# Patient Record
Sex: MALE | Race: OTHER | HISPANIC OR LATINO | URBAN - METROPOLITAN AREA
[De-identification: names, ages, dates, MRNs, and addresses within clinical notes are randomized per-mention and may not be internally consistent; named-entity substitution may affect disease eponyms.]

---

## 2021-06-27 ENCOUNTER — INPATIENT (INPATIENT)
Facility: HOSPITAL | Age: 21
LOS: 2 days | Discharge: HOME | End: 2021-06-30
Attending: INTERNAL MEDICINE | Admitting: INTERNAL MEDICINE
Payer: MEDICAID

## 2021-06-27 VITALS
OXYGEN SATURATION: 98 % | SYSTOLIC BLOOD PRESSURE: 141 MMHG | HEART RATE: 93 BPM | DIASTOLIC BLOOD PRESSURE: 86 MMHG | TEMPERATURE: 98 F | RESPIRATION RATE: 16 BRPM

## 2021-06-27 PROCEDURE — 99285 EMERGENCY DEPT VISIT HI MDM: CPT | Mod: 25

## 2021-06-27 PROCEDURE — 10060 I&D ABSCESS SIMPLE/SINGLE: CPT
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
## 2021-06-28 DIAGNOSIS — L03.90 CELLULITIS, UNSPECIFIED: ICD-10-CM

## 2021-06-28 DIAGNOSIS — L02.419 CUTANEOUS ABSCESS OF LIMB, UNSPECIFIED: ICD-10-CM

## 2021-06-28 DIAGNOSIS — L03.119 CELLULITIS OF UNSPECIFIED PART OF LIMB: ICD-10-CM

## 2021-06-28 DIAGNOSIS — D64.9 ANEMIA, UNSPECIFIED: ICD-10-CM

## 2021-06-28 DIAGNOSIS — Z29.9 ENCOUNTER FOR PROPHYLACTIC MEASURES, UNSPECIFIED: ICD-10-CM

## 2021-06-28 LAB
ALBUMIN SERPL ELPH-MCNC: 4.1 G/DL — SIGNIFICANT CHANGE UP (ref 3.3–5)
ALP SERPL-CCNC: 67 U/L — SIGNIFICANT CHANGE UP (ref 40–120)
ALT FLD-CCNC: 17 U/L — SIGNIFICANT CHANGE UP (ref 4–41)
ANION GAP SERPL CALC-SCNC: 12 MMOL/L — SIGNIFICANT CHANGE UP (ref 7–14)
ANION GAP SERPL CALC-SCNC: 12 MMOL/L — SIGNIFICANT CHANGE UP (ref 7–14)
AST SERPL-CCNC: 15 U/L — SIGNIFICANT CHANGE UP (ref 4–40)
BASE EXCESS BLDV CALC-SCNC: 5.2 MMOL/L — HIGH (ref -3–2)
BASOPHILS # BLD AUTO: 0.03 K/UL — SIGNIFICANT CHANGE UP (ref 0–0.2)
BASOPHILS # BLD AUTO: 0.04 K/UL — SIGNIFICANT CHANGE UP (ref 0–0.2)
BASOPHILS NFR BLD AUTO: 0.3 % — SIGNIFICANT CHANGE UP (ref 0–2)
BASOPHILS NFR BLD AUTO: 0.5 % — SIGNIFICANT CHANGE UP (ref 0–2)
BILIRUB SERPL-MCNC: 0.4 MG/DL — SIGNIFICANT CHANGE UP (ref 0.2–1.2)
BLOOD GAS VENOUS - CREATININE: 1 MG/DL — SIGNIFICANT CHANGE UP (ref 0.5–1.3)
BLOOD GAS VENOUS COMPREHENSIVE RESULT: SIGNIFICANT CHANGE UP
BUN SERPL-MCNC: 12 MG/DL — SIGNIFICANT CHANGE UP (ref 7–23)
BUN SERPL-MCNC: 16 MG/DL — SIGNIFICANT CHANGE UP (ref 7–23)
CALCIUM SERPL-MCNC: 8.9 MG/DL — SIGNIFICANT CHANGE UP (ref 8.4–10.5)
CALCIUM SERPL-MCNC: 9.3 MG/DL — SIGNIFICANT CHANGE UP (ref 8.4–10.5)
CHLORIDE BLDV-SCNC: 100 MMOL/L — SIGNIFICANT CHANGE UP (ref 96–108)
CHLORIDE SERPL-SCNC: 101 MMOL/L — SIGNIFICANT CHANGE UP (ref 98–107)
CHLORIDE SERPL-SCNC: 99 MMOL/L — SIGNIFICANT CHANGE UP (ref 98–107)
CO2 SERPL-SCNC: 24 MMOL/L — SIGNIFICANT CHANGE UP (ref 22–31)
CO2 SERPL-SCNC: 26 MMOL/L — SIGNIFICANT CHANGE UP (ref 22–31)
CREAT SERPL-MCNC: 0.73 MG/DL — SIGNIFICANT CHANGE UP (ref 0.5–1.3)
CREAT SERPL-MCNC: 0.91 MG/DL — SIGNIFICANT CHANGE UP (ref 0.5–1.3)
EOSINOPHIL # BLD AUTO: 0.31 K/UL — SIGNIFICANT CHANGE UP (ref 0–0.5)
EOSINOPHIL # BLD AUTO: 0.33 K/UL — SIGNIFICANT CHANGE UP (ref 0–0.5)
EOSINOPHIL NFR BLD AUTO: 3.7 % — SIGNIFICANT CHANGE UP (ref 0–6)
EOSINOPHIL NFR BLD AUTO: 4 % — SIGNIFICANT CHANGE UP (ref 0–6)
GAS PNL BLDV: 140 MMOL/L — SIGNIFICANT CHANGE UP (ref 136–146)
GLUCOSE BLDV-MCNC: 88 MG/DL — SIGNIFICANT CHANGE UP (ref 70–99)
GLUCOSE SERPL-MCNC: 91 MG/DL — SIGNIFICANT CHANGE UP (ref 70–99)
GLUCOSE SERPL-MCNC: 95 MG/DL — SIGNIFICANT CHANGE UP (ref 70–99)
HCO3 BLDV-SCNC: 27 MMOL/L — SIGNIFICANT CHANGE UP (ref 20–27)
HCT VFR BLD CALC: 35 % — LOW (ref 39–50)
HCT VFR BLD CALC: 38.3 % — LOW (ref 39–50)
HCT VFR BLDA CALC: 41.3 % — SIGNIFICANT CHANGE UP (ref 39–51)
HGB BLD CALC-MCNC: 13.5 G/DL — SIGNIFICANT CHANGE UP (ref 13–17)
HGB BLD-MCNC: 12 G/DL — LOW (ref 13–17)
HGB BLD-MCNC: 12.8 G/DL — LOW (ref 13–17)
IANC: 3.79 K/UL — SIGNIFICANT CHANGE UP (ref 1.5–8.5)
IANC: 5.04 K/UL — SIGNIFICANT CHANGE UP (ref 1.5–8.5)
IMM GRANULOCYTES NFR BLD AUTO: 0.1 % — SIGNIFICANT CHANGE UP (ref 0–1.5)
IMM GRANULOCYTES NFR BLD AUTO: 0.2 % — SIGNIFICANT CHANGE UP (ref 0–1.5)
LACTATE BLDV-MCNC: 1.1 MMOL/L — SIGNIFICANT CHANGE UP (ref 0.5–2)
LYMPHOCYTES # BLD AUTO: 2.42 K/UL — SIGNIFICANT CHANGE UP (ref 1–3.3)
LYMPHOCYTES # BLD AUTO: 2.68 K/UL — SIGNIFICANT CHANGE UP (ref 1–3.3)
LYMPHOCYTES # BLD AUTO: 27 % — SIGNIFICANT CHANGE UP (ref 13–44)
LYMPHOCYTES # BLD AUTO: 34.9 % — SIGNIFICANT CHANGE UP (ref 13–44)
MAGNESIUM SERPL-MCNC: 2.1 MG/DL — SIGNIFICANT CHANGE UP (ref 1.6–2.6)
MAGNESIUM SERPL-MCNC: 2.2 MG/DL — SIGNIFICANT CHANGE UP (ref 1.6–2.6)
MCHC RBC-ENTMCNC: 30.8 PG — SIGNIFICANT CHANGE UP (ref 27–34)
MCHC RBC-ENTMCNC: 31.4 PG — SIGNIFICANT CHANGE UP (ref 27–34)
MCHC RBC-ENTMCNC: 33.4 GM/DL — SIGNIFICANT CHANGE UP (ref 32–36)
MCHC RBC-ENTMCNC: 34.3 GM/DL — SIGNIFICANT CHANGE UP (ref 32–36)
MCV RBC AUTO: 91.6 FL — SIGNIFICANT CHANGE UP (ref 80–100)
MCV RBC AUTO: 92.1 FL — SIGNIFICANT CHANGE UP (ref 80–100)
MONOCYTES # BLD AUTO: 0.84 K/UL — SIGNIFICANT CHANGE UP (ref 0–0.9)
MONOCYTES # BLD AUTO: 1.12 K/UL — HIGH (ref 0–0.9)
MONOCYTES NFR BLD AUTO: 11 % — SIGNIFICANT CHANGE UP (ref 2–14)
MONOCYTES NFR BLD AUTO: 12.5 % — SIGNIFICANT CHANGE UP (ref 2–14)
NEUTROPHILS # BLD AUTO: 3.79 K/UL — SIGNIFICANT CHANGE UP (ref 1.8–7.4)
NEUTROPHILS # BLD AUTO: 5.04 K/UL — SIGNIFICANT CHANGE UP (ref 1.8–7.4)
NEUTROPHILS NFR BLD AUTO: 49.5 % — SIGNIFICANT CHANGE UP (ref 43–77)
NEUTROPHILS NFR BLD AUTO: 56.3 % — SIGNIFICANT CHANGE UP (ref 43–77)
NRBC # BLD: 0 /100 WBCS — SIGNIFICANT CHANGE UP
NRBC # BLD: 0 /100 WBCS — SIGNIFICANT CHANGE UP
NRBC # FLD: 0 K/UL — SIGNIFICANT CHANGE UP
NRBC # FLD: 0 K/UL — SIGNIFICANT CHANGE UP
PCO2 BLDV: 55 MMHG — HIGH (ref 41–51)
PH BLDV: 7.36 — SIGNIFICANT CHANGE UP (ref 7.32–7.43)
PHOSPHATE SERPL-MCNC: 3.4 MG/DL — SIGNIFICANT CHANGE UP (ref 2.5–4.5)
PHOSPHATE SERPL-MCNC: 3.7 MG/DL — SIGNIFICANT CHANGE UP (ref 2.5–4.5)
PLATELET # BLD AUTO: 211 K/UL — SIGNIFICANT CHANGE UP (ref 150–400)
PLATELET # BLD AUTO: 235 K/UL — SIGNIFICANT CHANGE UP (ref 150–400)
PO2 BLDV: 24 MMHG — LOW (ref 35–40)
POTASSIUM BLDV-SCNC: 3.5 MMOL/L — SIGNIFICANT CHANGE UP (ref 3.4–4.5)
POTASSIUM SERPL-MCNC: 3.2 MMOL/L — LOW (ref 3.5–5.3)
POTASSIUM SERPL-MCNC: 3.7 MMOL/L — SIGNIFICANT CHANGE UP (ref 3.5–5.3)
POTASSIUM SERPL-SCNC: 3.2 MMOL/L — LOW (ref 3.5–5.3)
POTASSIUM SERPL-SCNC: 3.7 MMOL/L — SIGNIFICANT CHANGE UP (ref 3.5–5.3)
PROT SERPL-MCNC: 7.6 G/DL — SIGNIFICANT CHANGE UP (ref 6–8.3)
RBC # BLD: 3.82 M/UL — LOW (ref 4.2–5.8)
RBC # BLD: 4.16 M/UL — LOW (ref 4.2–5.8)
RBC # FLD: 11.6 % — SIGNIFICANT CHANGE UP (ref 10.3–14.5)
RBC # FLD: 11.9 % — SIGNIFICANT CHANGE UP (ref 10.3–14.5)
SAO2 % BLDV: 37.2 % — LOW (ref 60–85)
SARS-COV-2 RNA SPEC QL NAA+PROBE: SIGNIFICANT CHANGE UP
SODIUM SERPL-SCNC: 137 MMOL/L — SIGNIFICANT CHANGE UP (ref 135–145)
SODIUM SERPL-SCNC: 137 MMOL/L — SIGNIFICANT CHANGE UP (ref 135–145)
WBC # BLD: 7.67 K/UL — SIGNIFICANT CHANGE UP (ref 3.8–10.5)
WBC # BLD: 8.96 K/UL — SIGNIFICANT CHANGE UP (ref 3.8–10.5)
WBC # FLD AUTO: 7.67 K/UL — SIGNIFICANT CHANGE UP (ref 3.8–10.5)
WBC # FLD AUTO: 8.96 K/UL — SIGNIFICANT CHANGE UP (ref 3.8–10.5)

## 2021-06-28 PROCEDURE — 73590 X-RAY EXAM OF LOWER LEG: CPT | Mod: 26,LT

## 2021-06-28 PROCEDURE — 99223 1ST HOSP IP/OBS HIGH 75: CPT

## 2021-06-28 PROCEDURE — 99232 SBSQ HOSP IP/OBS MODERATE 35: CPT | Mod: GC

## 2021-06-28 PROCEDURE — 99255 IP/OBS CONSLTJ NEW/EST HI 80: CPT

## 2021-06-28 RX ORDER — VANCOMYCIN HCL 1 G
1000 VIAL (EA) INTRAVENOUS EVERY 12 HOURS
Refills: 0 | Status: DISCONTINUED | OUTPATIENT
Start: 2021-06-28 | End: 2021-06-30

## 2021-06-28 RX ORDER — SODIUM CHLORIDE 9 MG/ML
1000 INJECTION, SOLUTION INTRAVENOUS ONCE
Refills: 0 | Status: COMPLETED | OUTPATIENT
Start: 2021-06-28 | End: 2021-06-28

## 2021-06-28 RX ORDER — SODIUM CHLORIDE 9 MG/ML
3 INJECTION INTRAMUSCULAR; INTRAVENOUS; SUBCUTANEOUS EVERY 8 HOURS
Refills: 0 | Status: DISCONTINUED | OUTPATIENT
Start: 2021-06-28 | End: 2021-06-30

## 2021-06-28 RX ORDER — CEFEPIME 1 G/1
1000 INJECTION, POWDER, FOR SOLUTION INTRAMUSCULAR; INTRAVENOUS ONCE
Refills: 0 | Status: COMPLETED | OUTPATIENT
Start: 2021-06-28 | End: 2021-06-28

## 2021-06-28 RX ORDER — CEFEPIME 1 G/1
1000 INJECTION, POWDER, FOR SOLUTION INTRAMUSCULAR; INTRAVENOUS EVERY 12 HOURS
Refills: 0 | Status: DISCONTINUED | OUTPATIENT
Start: 2021-06-28 | End: 2021-06-29

## 2021-06-28 RX ORDER — CEFEPIME 1 G/1
INJECTION, POWDER, FOR SOLUTION INTRAMUSCULAR; INTRAVENOUS
Refills: 0 | Status: DISCONTINUED | OUTPATIENT
Start: 2021-06-28 | End: 2021-06-29

## 2021-06-28 RX ADMIN — CEFEPIME 100 MILLIGRAM(S): 1 INJECTION, POWDER, FOR SOLUTION INTRAMUSCULAR; INTRAVENOUS at 01:43

## 2021-06-28 RX ADMIN — CEFEPIME 100 MILLIGRAM(S): 1 INJECTION, POWDER, FOR SOLUTION INTRAMUSCULAR; INTRAVENOUS at 18:33

## 2021-06-28 RX ADMIN — SODIUM CHLORIDE 1000 MILLILITER(S): 9 INJECTION, SOLUTION INTRAVENOUS at 00:24

## 2021-06-28 RX ADMIN — SODIUM CHLORIDE 3 MILLILITER(S): 9 INJECTION INTRAMUSCULAR; INTRAVENOUS; SUBCUTANEOUS at 21:30

## 2021-06-28 RX ADMIN — SODIUM CHLORIDE 3 MILLILITER(S): 9 INJECTION INTRAMUSCULAR; INTRAVENOUS; SUBCUTANEOUS at 08:21

## 2021-06-28 RX ADMIN — SODIUM CHLORIDE 3 MILLILITER(S): 9 INJECTION INTRAMUSCULAR; INTRAVENOUS; SUBCUTANEOUS at 14:30

## 2021-06-28 RX ADMIN — Medication 110 MILLIGRAM(S): at 02:15

## 2021-06-28 RX ADMIN — Medication 250 MILLIGRAM(S): at 14:24
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
## 2021-06-28 NOTE — H&P ADULT - PROBLEM SELECTOR PLAN 3
Will hold off anticoagulation as patient has not risk factors for DVT. Patient with hemoglobin of 12.8 on admission. No S/S of active bleed.  - Monitor H/H
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
## 2021-06-28 NOTE — H&P ADULT - NSHPSOCIALHISTORY_GEN_ALL_CORE
Patient denies use of cigarettes. Patient states he uses marijuana frequently and drinks alcohol occasionally. Patient works in an ice cream shop. Patient denies use of tobacco. Patient states he uses marijuana frequently and drinks alcohol occasionally. Patient works in an ice cream shop.
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
## 2021-06-28 NOTE — H&P ADULT - NSHPPHYSICALEXAM_GEN_ALL_CORE
Vital Signs Last 24 Hrs  T(C): 36.6 (27 Jun 2021 23:27), Max: 36.6 (27 Jun 2021 23:27)  T(F): 97.9 (27 Jun 2021 23:27), Max: 97.9 (27 Jun 2021 23:27)  HR: 93 (27 Jun 2021 23:27) (93 - 93)  BP: 141/86 (27 Jun 2021 23:27) (141/86 - 141/86)  BP(mean): --  RR: 16 (27 Jun 2021 23:27) (16 - 16)  SpO2: 98% (27 Jun 2021 23:27) (98% - 98%)

## 2021-06-28 NOTE — H&P ADULT - PROBLEM SELECTOR PLAN 1
Admit to medicine, continue monitoring.  Surgery recs appreciated.  Daily dressing changes.  Patient s/p Incision and drainage in ED.  Surgery recommend broad spectrum antibiotics.  Patient s/p cefepime and doxycyline in ED, will continue.  Monitor area for increased swelling and erythema.  Monitor for fever. Admit to medicine, continue monitoring.  Surgery recs appreciated.  Daily dressing changes.  Patient s/p Incision and drainage in ED.  Surgery recommend broad spectrum antibiotics.  Patient s/p cefepime and doxycyline in ED, will continue.  Monitor area for increased swelling and erythema.  Monitor for fever.  Follow up cultures. Admit to medicine, continue monitoring.  Surgery recs appreciated.  Daily dressing changes.  Patient s/p Incision and drainage in ED.  Surgery recommend broad spectrum antibiotics.  Patient s/p cefepime and doxycyline in ED, will continue. to cover for vibrio and purulent cellulitis.  Call for ID consult in AM.  Monitor area for increased swelling and erythema.  Monitor for fever.  Follow up cultures. Admit to medicine, continue monitoring.  Surgery recs appreciated.  Daily dressing changes.  Patient s/p Incision and drainage in ED.  Surgery recommend broad spectrum antibiotics.  Patient s/p cefepime and doxycyline in ED, will continue to cover for vibrio (had multiple cases in this area last year) and purulent cellulitis.  Call for ID consult in AM.  Monitor area for increased swelling and erythema.  Monitor for fever.  Follow up cultures. Pt with pus drainage on left anterior lower extremity following recent trauma. S/p I&D in ED.  - Given superimposed cellulitis, c/w abx as below  - Surgery consulted, appreciate recs  - Daily wound care, dressing changes  - Keep LLE elevated

## 2021-06-28 NOTE — H&P ADULT - MUSCULOSKELETAL
no calf tenderness/normal strength detailed exam details… ROM intact/no joint swelling/no joint erythema/no joint warmth/no calf tenderness/normal strength

## 2021-06-28 NOTE — ED PROVIDER NOTE - PHYSICAL EXAMINATION
Gen: NAD, AOx3, able to make needs known, non-toxic  Head: NCAT  HEENT: EOMI, oral mucosa moist, normal conjunctiva  Lung: CTAB, no respiratory distress, no wheezes/rhonchi/rales B/L, speaking in full sentences  CV: RRR, no murmurs  Abd: soft, NTND, no guarding  MSK: no visible deformities. LLE: anterior lower leg w/ approx 20 x 10 cm erythematous area w/ overlying abrasion, pus draining from superior margin w/ gentle pressure applied to wound  Neuro: Appears non focal  Skin: Warm, well perfused  Psych: normal affect Gen: NAD, AOx3, able to make needs known, non-toxic  Head: NCAT  HEENT: EOMI, oral mucosa moist, normal conjunctiva  Lung: CTAB, no respiratory distress, no wheezes/rhonchi/rales B/L, speaking in full sentences  CV: RRR, no murmurs  Abd: soft, NTND, no guarding  MSK: no visible deformities. LLE: anterior lower leg w/ approx 20 x 10 cm erythematous area w/ overlying abrasion, pus draining from superior margin w/ gentle pressure applied to wound but no fluctuance noted  Neuro: Appears non focal  Skin: Warm, well perfused  Psych: normal affect

## 2021-06-28 NOTE — H&P ADULT - ASSESSMENT
20 y/o M with no significant PMH presents to the ED with left leg infection concerning for cellulitis. 20 y/o M with no significant PMH presents to the ED with left leg infection, concerning for abscess with superimposed cellulitis.
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
## 2021-06-28 NOTE — H&P ADULT - NEGATIVE ENMT SYMPTOMS
no vertigo/no nasal congestion no ear pain/no tinnitus/no vertigo/no nasal congestion/no nasal discharge/no throat pain/no dysphagia

## 2021-06-28 NOTE — H&P ADULT - NEGATIVE GASTROINTESTINAL SYMPTOMS
no nausea/no vomiting/no constipation no nausea/no vomiting/no diarrhea/no constipation/no abdominal pain/no melena/no hematochezia

## 2021-06-28 NOTE — H&P ADULT - NEGATIVE NEUROLOGICAL SYMPTOMS
no weakness/no syncope no weakness/no syncope/no vertigo/no loss of sensation/no headache/no loss of consciousness

## 2021-06-28 NOTE — H&P ADULT - EXTREMITIES COMMENTS
20 cm x 8cm area of erythema present on Left anterior lower extremity. Wound bandaged. Area warm to touch. Edema present form ankle to knee. No visible discharge noted. 20 cm x 8 cm area of erythema present on left anterior lower extremity. Wound bandaged. Area warm to touch. Edema present from ankle to knee. No visible discharge/drainage noted. No eschar or crepitus noted.

## 2021-06-28 NOTE — H&P ADULT - PROBLEM SELECTOR PLAN 2
Patient with hemoglobin of 12.8.  Continue to trend. Pt with abscess and superimposed cellulitis following recent trauma, also with exposure to salt water. S/p I&D in ED.  - S/p cefepime and doxycyline in ED, will continue to cover for Vibrio (had multiple cases in this area last year) and MRSA given purulent cellulitis.   - Call for ID consult in AM  - F/u blood and wound cxs  - Monitor area for enlarging edema and erythema  - Monitor for fever  - Pain control with Tylenol PRN

## 2021-06-28 NOTE — ED PROVIDER NOTE - CLINICAL SUMMARY MEDICAL DECISION MAKING FREE TEXT BOX
22 y/o M w/ no sig PMH presenting w/ L leg infection. Pt overall non toxic appearing. Purulent cellulitis on exam. Failed outpatient abx. Will admit for IV abx. Will ensure coverage of marine organisms given saltwater exposure day after injury. Plan for labs, IVF, abx, xray, cultures, admission. Will reassess the need for additional interventions as clinically warranted. 22 y/o M w/ no sig PMH presenting w/ L leg infection. Pt overall non toxic appearing. Purulent cellulitis on exam, no drainable abscess noted. Failed outpatient abx. Will admit for IV abx. Will ensure coverage of marine organisms given saltwater exposure day after injury. Plan for labs, IVF, abx, xray, cultures, admission. Will reassess the need for additional interventions as clinically warranted.

## 2021-06-28 NOTE — H&P ADULT - PROBLEM SELECTOR PLAN 4
- DVT ppx: No pharmacologic ppx given IMPROVE score 0, low risk for VTE. Encourage OOB and ambulation as tolerated.

## 2021-06-28 NOTE — H&P ADULT - NSICDXFAMILYHX_GEN_ALL_CORE_FT
FAMILY HISTORY:  Mother  Still living? Unknown  FH: hypertension, Age at diagnosis: Age Unknown    Grandparent  Still living? Unknown  FH: type 2 diabetes, Age at diagnosis: Age Unknown

## 2021-06-28 NOTE — H&P ADULT - NSHPLABSRESULTS_GEN_ALL_CORE
Vital Signs Last 24 Hrs  T(C): 36.6 (27 Jun 2021 23:27), Max: 36.6 (27 Jun 2021 23:27)  T(F): 97.9 (27 Jun 2021 23:27), Max: 97.9 (27 Jun 2021 23:27)  HR: 93 (27 Jun 2021 23:27) (93 - 93)  BP: 141/86 (27 Jun 2021 23:27) (141/86 - 141/86)  BP(mean): --  RR: 16 (27 Jun 2021 23:27) (16 - 16)  SpO2: 98% (27 Jun 2021 23:27) (98% - 98%)                          12.8   8.96  )-----------( 235      ( 28 Jun 2021 00:31 )             38.3   06-28    137  |  99  |  16  ----------------------------<  91  3.7   |  26  |  0.91    Ca    9.3      28 Jun 2021 00:31  Phos  3.7     06-28  Mg     2.2     06-28    TPro  7.6  /  Alb  4.1  /  TBili  0.4  /  DBili  x   /  AST  15  /  ALT  17  /  AlkPhos  67  06-28    00:31 - VBG - pH: 7.36  | pCO2: 55    | pO2: 24    | Lactate: 1.1 12.8   8.96  )-----------( 235      ( 28 Jun 2021 00:31 )             38.3   06-28    137  |  99  |  16  ----------------------------<  91  3.7   |  26  |  0.91    Ca    9.3      28 Jun 2021 00:31  Phos  3.7     06-28  Mg     2.2     06-28    TPro  7.6  /  Alb  4.1  /  TBili  0.4  /  DBili  x   /  AST  15  /  ALT  17  /  AlkPhos  67  06-28    00:31 - VBG - pH: 7.36  | pCO2: 55    | pO2: 24    | Lactate: 1.1    Imaging personally reviewed.  XRAY L TIB/FIB FINDINGS:    No acute fracture or dislocation.  Joint spaces are maintained.  Left lower extremity soft tissue swelling.  No skin ulceration is identified.    IMPRESSION:  Left lower extremity swelling. No radiographic evidence for osteomyelitis.

## 2021-06-28 NOTE — H&P ADULT - ITE SK HX ROS MEA POS PC
Erythema on Left anterior lower leg, Erythematous, Warm to touch. Edema, erythema, and pain on L shin near site of abrasion

## 2021-06-28 NOTE — H&P ADULT - GASTROINTESTINAL DETAILS
soft/nontender/no distention/no masses palpable/bowel sounds normal soft/nontender/no distention/no masses palpable/bowel sounds normal/no rebound tenderness/no guarding

## 2021-06-28 NOTE — CONSULT NOTE ADULT - ATTENDING COMMENTS
I saw and examined the patient. I was physically present for the key portions of the evaluation and management (E/M) service provided.  I agree with the above history, physical, and plan which I have reviewed and edited where appropriate.    Cellulitis currently being treated w/ source control completed by ER  -broad spectrum abx - had been on clinda and now changed to the current regiment   -marker borders to monitor  progression  -keep limb elevated   -will follow     Bon Jensen MD  Acute and Critical Care Surgery    The Acute Care Surgery (B Team) Attending Group Practice:  Dr. Griffin Escobar, Dr. Pablo Treviño, Dr. Bon Jensen, and Dr. Ayush Cloud    Urgent issues - spectra [id number] or [id number]  Nonurgent issues - [phone]  Patient appointments or after hours - [phone]

## 2021-06-28 NOTE — ED PROVIDER NOTE - ATTENDING CONTRIBUTION TO CARE
Attending Attestation: Dr. Carrasco  I have personally performed a history and physical examination of the patient and discussed management with the resident as well as the patient.  I reviewed the resident's note and agree with the documented findings and plan of care.  I have authored and modified critical sections of the Provider Note, including but not limited to HPI, Physical Exam and MDM. 22 y/o M w/ no sig PMH presenting w/ L leg infection. Pt overall non toxic appearing. Purulent cellulitis on exam, no drainable abscess noted. Failed outpatient abx. Will admit for IV abx. Will ensure coverage of marine organisms given saltwater exposure day after injury. Plan for labs, IVF, abx, xray, cultures, admission. Will reassess the need for additional interventions as clinically warranted.

## 2021-06-28 NOTE — ED ADULT NURSE REASSESSMENT NOTE - NS ED NURSE REASSESS COMMENT FT1
pt resting comfortably in bed, denies complaints at this time. vs as noted in flowsheet. pt in NAD, respirations even and unlabored. will continue to monitor.

## 2021-06-28 NOTE — ED PROCEDURE NOTE - ATTENDING CONTRIBUTION TO CARE
I performed a face-to-face evaluation of the patient and performed a history and physical examination. I agree with the history and physical examination.    Nita: I was present during the critical part of the procedure.

## 2021-06-28 NOTE — H&P ADULT - NEGATIVE OPHTHALMOLOGIC SYMPTOMS
no blurred vision L/no blurred vision R no diplopia/no photophobia/no blurred vision L/no blurred vision R

## 2021-06-28 NOTE — PROGRESS NOTE ADULT - ASSESSMENT
22 y/o M with no significant PMH presents to the ED with left leg infection, concerning for abscess with superimposed cellulitis.
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
## 2021-06-28 NOTE — ED PROVIDER NOTE - PROGRESS NOTE DETAILS
Dr. Kirk Torres, PGY-3: seen by surg, agree w/ current course and no plan for procedure. Will inform hospitalist and admit

## 2021-06-29 LAB
ALBUMIN SERPL ELPH-MCNC: 3.8 G/DL — SIGNIFICANT CHANGE UP (ref 3.3–5)
ALP SERPL-CCNC: 63 U/L — SIGNIFICANT CHANGE UP (ref 40–120)
ALT FLD-CCNC: 16 U/L — SIGNIFICANT CHANGE UP (ref 4–41)
ANION GAP SERPL CALC-SCNC: 12 MMOL/L — SIGNIFICANT CHANGE UP (ref 7–14)
AST SERPL-CCNC: 14 U/L — SIGNIFICANT CHANGE UP (ref 4–40)
BASOPHILS # BLD AUTO: 0.06 K/UL — SIGNIFICANT CHANGE UP (ref 0–0.2)
BASOPHILS NFR BLD AUTO: 0.9 % — SIGNIFICANT CHANGE UP (ref 0–2)
BILIRUB SERPL-MCNC: 0.3 MG/DL — SIGNIFICANT CHANGE UP (ref 0.2–1.2)
BUN SERPL-MCNC: 10 MG/DL — SIGNIFICANT CHANGE UP (ref 7–23)
CALCIUM SERPL-MCNC: 9 MG/DL — SIGNIFICANT CHANGE UP (ref 8.4–10.5)
CHLORIDE SERPL-SCNC: 101 MMOL/L — SIGNIFICANT CHANGE UP (ref 98–107)
CO2 SERPL-SCNC: 26 MMOL/L — SIGNIFICANT CHANGE UP (ref 22–31)
COVID-19 SPIKE DOMAIN AB INTERP: NEGATIVE — SIGNIFICANT CHANGE UP
COVID-19 SPIKE DOMAIN ANTIBODY RESULT: 0.4 U/ML — SIGNIFICANT CHANGE UP
CREAT SERPL-MCNC: 0.7 MG/DL — SIGNIFICANT CHANGE UP (ref 0.5–1.3)
EOSINOPHIL # BLD AUTO: 0.33 K/UL — SIGNIFICANT CHANGE UP (ref 0–0.5)
EOSINOPHIL NFR BLD AUTO: 4.7 % — SIGNIFICANT CHANGE UP (ref 0–6)
GLUCOSE SERPL-MCNC: 96 MG/DL — SIGNIFICANT CHANGE UP (ref 70–99)
HCT VFR BLD CALC: 40 % — SIGNIFICANT CHANGE UP (ref 39–50)
HGB BLD-MCNC: 13.5 G/DL — SIGNIFICANT CHANGE UP (ref 13–17)
IANC: 3.01 K/UL — SIGNIFICANT CHANGE UP (ref 1.5–8.5)
IMM GRANULOCYTES NFR BLD AUTO: 0.4 % — SIGNIFICANT CHANGE UP (ref 0–1.5)
LYMPHOCYTES # BLD AUTO: 2.83 K/UL — SIGNIFICANT CHANGE UP (ref 1–3.3)
LYMPHOCYTES # BLD AUTO: 40.6 % — SIGNIFICANT CHANGE UP (ref 13–44)
MCHC RBC-ENTMCNC: 31 PG — SIGNIFICANT CHANGE UP (ref 27–34)
MCHC RBC-ENTMCNC: 33.8 GM/DL — SIGNIFICANT CHANGE UP (ref 32–36)
MCV RBC AUTO: 92 FL — SIGNIFICANT CHANGE UP (ref 80–100)
MONOCYTES # BLD AUTO: 0.71 K/UL — SIGNIFICANT CHANGE UP (ref 0–0.9)
MONOCYTES NFR BLD AUTO: 10.2 % — SIGNIFICANT CHANGE UP (ref 2–14)
NEUTROPHILS # BLD AUTO: 3.01 K/UL — SIGNIFICANT CHANGE UP (ref 1.8–7.4)
NEUTROPHILS NFR BLD AUTO: 43.2 % — SIGNIFICANT CHANGE UP (ref 43–77)
NRBC # BLD: 0 /100 WBCS — SIGNIFICANT CHANGE UP
NRBC # FLD: 0 K/UL — SIGNIFICANT CHANGE UP
PLATELET # BLD AUTO: 262 K/UL — SIGNIFICANT CHANGE UP (ref 150–400)
POTASSIUM SERPL-MCNC: 3.1 MMOL/L — LOW (ref 3.5–5.3)
POTASSIUM SERPL-SCNC: 3.1 MMOL/L — LOW (ref 3.5–5.3)
PROT SERPL-MCNC: 6.9 G/DL — SIGNIFICANT CHANGE UP (ref 6–8.3)
RBC # BLD: 4.35 M/UL — SIGNIFICANT CHANGE UP (ref 4.2–5.8)
RBC # FLD: 11.7 % — SIGNIFICANT CHANGE UP (ref 10.3–14.5)
SARS-COV-2 IGG+IGM SERPL QL IA: 0.4 U/ML — SIGNIFICANT CHANGE UP
SARS-COV-2 IGG+IGM SERPL QL IA: NEGATIVE — SIGNIFICANT CHANGE UP
SODIUM SERPL-SCNC: 139 MMOL/L — SIGNIFICANT CHANGE UP (ref 135–145)
WBC # BLD: 6.97 K/UL — SIGNIFICANT CHANGE UP (ref 3.8–10.5)
WBC # FLD AUTO: 6.97 K/UL — SIGNIFICANT CHANGE UP (ref 3.8–10.5)

## 2021-06-29 PROCEDURE — 99232 SBSQ HOSP IP/OBS MODERATE 35: CPT

## 2021-06-29 RX ORDER — POTASSIUM CHLORIDE 20 MEQ
40 PACKET (EA) ORAL EVERY 4 HOURS
Refills: 0 | Status: COMPLETED | OUTPATIENT
Start: 2021-06-29 | End: 2021-06-29

## 2021-06-29 RX ADMIN — SODIUM CHLORIDE 3 MILLILITER(S): 9 INJECTION INTRAMUSCULAR; INTRAVENOUS; SUBCUTANEOUS at 06:52

## 2021-06-29 RX ADMIN — Medication 40 MILLIEQUIVALENT(S): at 14:06

## 2021-06-29 RX ADMIN — Medication 250 MILLIGRAM(S): at 14:06

## 2021-06-29 RX ADMIN — Medication 40 MILLIEQUIVALENT(S): at 12:40

## 2021-06-29 RX ADMIN — SODIUM CHLORIDE 3 MILLILITER(S): 9 INJECTION INTRAMUSCULAR; INTRAVENOUS; SUBCUTANEOUS at 14:01

## 2021-06-29 RX ADMIN — Medication 250 MILLIGRAM(S): at 03:14

## 2021-06-29 RX ADMIN — CEFEPIME 100 MILLIGRAM(S): 1 INJECTION, POWDER, FOR SOLUTION INTRAMUSCULAR; INTRAVENOUS at 05:26

## 2021-06-29 RX ADMIN — SODIUM CHLORIDE 3 MILLILITER(S): 9 INJECTION INTRAMUSCULAR; INTRAVENOUS; SUBCUTANEOUS at 21:27

## 2021-06-29 NOTE — PROGRESS NOTE ADULT - PROBLEM SELECTOR PLAN 1
Pt with pus drainage on left anterior lower extremity following recent trauma. S/p I&D in ED.  - Given superimposed cellulitis, c/w abx as below  - Surgery consulted, appreciate recs  - Daily wound care, dressing changes  - Keep LLE elevated
Pt with pus drainage on left anterior lower extremity following recent trauma. S/p I&D in ED.  - Given superimposed cellulitis, c/w abx as below  - Surgery consulted, appreciate recs  - Daily wound care, dressing changes  - Keep LLE elevated  - if no improvement may need CT
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
## 2021-06-29 NOTE — PATIENT PROFILE ADULT - DO YOU LACK THE NECESSARY SUPPORT TO HELP YOU COPE WITH LIFE CHALLENGES?
Pharmacy New Medication Education    Patient and/or Caregiver ACCEPTED medication education.    Pharmacy has provided education on the name, indication, and possible side effects of the medication(s) prescribed, using teach-back method.     Learners of pharmacy medication education includes:  patient    The following medications have also been discussed, during this admission.     Current Facility-Administered Medications   Medication Frequency    0.9%  NaCl infusion (for blood administration) Q24H PRN    0.9%  NaCl infusion (for blood administration) Q24H PRN    acetaminophen tablet 650 mg Q4H PRN    amLODIPine tablet 5 mg Daily    benzonatate capsule 100 mg TID PRN    diphenhydrAMINE-zinc acetate 2-0.1% cream TID PRN    enoxaparin injection 40 mg Q24H    heparin (porcine) injection 2,300 Units PRN    influenza (QUADRIVALENT PF) vaccine 0.5 mL vaccine x 1 dose    lorazepam (ATIVAN) injection 1 mg TID PRN    melatonin tablet 6 mg Nightly PRN    metoprolol tartrate (LOPRESSOR) tablet 100 mg BID    morphine injection 2 mg Q4H PRN    ondansetron disintegrating tablet 4 mg Q6H PRN    ondansetron disintegrating tablet 4 mg Q6H PRN    ondansetron injection 4 mg Q6H PRN    ondansetron injection 4 mg Q6H PRN    pneumoc 13-davion conj-dip cr(PF) (PREVNAR 13 (PF)) 0.5 mL vaccine x 1 dose    potassium phosphate (monobasic) tablet 500 mg TID    promethazine (PHENERGAN) 12.5 mg in dextrose 5 % 50 mL IVPB Q6H PRN    psyllium husk (aspartame) 3.4 gram packet Daily    simethicone chewable tablet 125 mg Q6H PRN    sodium chloride 0.9% flush 10 mL PRN    sodium chloride 0.9% flush 10 mL Q6H    And    sodium chloride 0.9% flush 10 mL PRN    spironolactone tablet 25 mg Daily    vancomycin - pharmacy to dose pharmacy to manage frequency    vancomycin in dextrose 5 % 1 gram/250 mL IVPB 1,000 mg Q12H        Thank you  Robi Bojra, DionteD               yes

## 2021-06-29 NOTE — PROGRESS NOTE ADULT - ASSESSMENT
20 y/o M with no significant PMH presents to the ED with left leg infection, concerning for abscess with superimposed cellulitis.

## 2021-06-29 NOTE — PROGRESS NOTE ADULT - ASSESSMENT
Assessment and Recommendation:   · Assessment	  ASSESSMENT:  Junior Jefferson is a very pleasant 21 year old gentleman with cellulitis and superficial L leg abscess s/p I&D    PLAN:  - C/w broad spectrum IV Abx  - Daily dressing changes  - Will follow  - Plan pending discussion with attending. Assessment and Recommendation:   · Assessment	  ASSESSMENT:  Junior Jefferson is a very pleasant 21 year old gentleman with cellulitis and superficial L leg abscess s/p I&D    PLAN:  - adequate drainage with now just cellulitis, no concern for fascitis, continue local wound care   - C/w broad spectrum IV Abx per ID recommendation   - please reconsult as needed for further questions

## 2021-06-30 VITALS
TEMPERATURE: 98 F | RESPIRATION RATE: 18 BRPM | SYSTOLIC BLOOD PRESSURE: 107 MMHG | DIASTOLIC BLOOD PRESSURE: 71 MMHG | HEART RATE: 74 BPM | OXYGEN SATURATION: 100 %

## 2021-06-30 LAB
-  AMPICILLIN/SULBACTAM: SIGNIFICANT CHANGE UP
-  CEFAZOLIN: SIGNIFICANT CHANGE UP
-  CLINDAMYCIN: SIGNIFICANT CHANGE UP
-  ERYTHROMYCIN: SIGNIFICANT CHANGE UP
-  GENTAMICIN: SIGNIFICANT CHANGE UP
-  OXACILLIN: SIGNIFICANT CHANGE UP
-  RIFAMPIN: SIGNIFICANT CHANGE UP
-  TETRACYCLINE: SIGNIFICANT CHANGE UP
-  TRIMETHOPRIM/SULFAMETHOXAZOLE: SIGNIFICANT CHANGE UP
-  VANCOMYCIN: SIGNIFICANT CHANGE UP
ALBUMIN SERPL ELPH-MCNC: 4.4 G/DL — SIGNIFICANT CHANGE UP (ref 3.3–5)
ALP SERPL-CCNC: 66 U/L — SIGNIFICANT CHANGE UP (ref 40–120)
ALT FLD-CCNC: 15 U/L — SIGNIFICANT CHANGE UP (ref 4–41)
ANION GAP SERPL CALC-SCNC: 16 MMOL/L — HIGH (ref 7–14)
AST SERPL-CCNC: 15 U/L — SIGNIFICANT CHANGE UP (ref 4–40)
BASOPHILS # BLD AUTO: 0.04 K/UL — SIGNIFICANT CHANGE UP (ref 0–0.2)
BASOPHILS NFR BLD AUTO: 0.5 % — SIGNIFICANT CHANGE UP (ref 0–2)
BILIRUB SERPL-MCNC: 0.5 MG/DL — SIGNIFICANT CHANGE UP (ref 0.2–1.2)
BUN SERPL-MCNC: 11 MG/DL — SIGNIFICANT CHANGE UP (ref 7–23)
CALCIUM SERPL-MCNC: 10 MG/DL — SIGNIFICANT CHANGE UP (ref 8.4–10.5)
CHLORIDE SERPL-SCNC: 98 MMOL/L — SIGNIFICANT CHANGE UP (ref 98–107)
CO2 SERPL-SCNC: 22 MMOL/L — SIGNIFICANT CHANGE UP (ref 22–31)
CREAT SERPL-MCNC: 0.76 MG/DL — SIGNIFICANT CHANGE UP (ref 0.5–1.3)
EOSINOPHIL # BLD AUTO: 0.26 K/UL — SIGNIFICANT CHANGE UP (ref 0–0.5)
EOSINOPHIL NFR BLD AUTO: 3.5 % — SIGNIFICANT CHANGE UP (ref 0–6)
GLUCOSE SERPL-MCNC: 83 MG/DL — SIGNIFICANT CHANGE UP (ref 70–99)
HCT VFR BLD CALC: 43.5 % — SIGNIFICANT CHANGE UP (ref 39–50)
HGB BLD-MCNC: 14.5 G/DL — SIGNIFICANT CHANGE UP (ref 13–17)
IANC: 3.18 K/UL — SIGNIFICANT CHANGE UP (ref 1.5–8.5)
IMM GRANULOCYTES NFR BLD AUTO: 0.5 % — SIGNIFICANT CHANGE UP (ref 0–1.5)
LYMPHOCYTES # BLD AUTO: 3.23 K/UL — SIGNIFICANT CHANGE UP (ref 1–3.3)
LYMPHOCYTES # BLD AUTO: 43.2 % — SIGNIFICANT CHANGE UP (ref 13–44)
MCHC RBC-ENTMCNC: 30.5 PG — SIGNIFICANT CHANGE UP (ref 27–34)
MCHC RBC-ENTMCNC: 33.3 GM/DL — SIGNIFICANT CHANGE UP (ref 32–36)
MCV RBC AUTO: 91.4 FL — SIGNIFICANT CHANGE UP (ref 80–100)
METHOD TYPE: SIGNIFICANT CHANGE UP
MONOCYTES # BLD AUTO: 0.72 K/UL — SIGNIFICANT CHANGE UP (ref 0–0.9)
MONOCYTES NFR BLD AUTO: 9.6 % — SIGNIFICANT CHANGE UP (ref 2–14)
NEUTROPHILS # BLD AUTO: 3.18 K/UL — SIGNIFICANT CHANGE UP (ref 1.8–7.4)
NEUTROPHILS NFR BLD AUTO: 42.7 % — LOW (ref 43–77)
NRBC # BLD: 0 /100 WBCS — SIGNIFICANT CHANGE UP
NRBC # FLD: 0 K/UL — SIGNIFICANT CHANGE UP
PLATELET # BLD AUTO: 300 K/UL — SIGNIFICANT CHANGE UP (ref 150–400)
POTASSIUM SERPL-MCNC: 3.9 MMOL/L — SIGNIFICANT CHANGE UP (ref 3.5–5.3)
POTASSIUM SERPL-SCNC: 3.9 MMOL/L — SIGNIFICANT CHANGE UP (ref 3.5–5.3)
PROT SERPL-MCNC: 8.3 G/DL — SIGNIFICANT CHANGE UP (ref 6–8.3)
RBC # BLD: 4.76 M/UL — SIGNIFICANT CHANGE UP (ref 4.2–5.8)
RBC # FLD: 11.7 % — SIGNIFICANT CHANGE UP (ref 10.3–14.5)
SODIUM SERPL-SCNC: 136 MMOL/L — SIGNIFICANT CHANGE UP (ref 135–145)
VANCOMYCIN TROUGH SERPL-MCNC: 3.4 UG/ML — LOW (ref 10–20)
WBC # BLD: 7.47 K/UL — SIGNIFICANT CHANGE UP (ref 3.8–10.5)
WBC # FLD AUTO: 7.47 K/UL — SIGNIFICANT CHANGE UP (ref 3.8–10.5)

## 2021-06-30 PROCEDURE — 99232 SBSQ HOSP IP/OBS MODERATE 35: CPT

## 2021-06-30 RX ORDER — CEFAZOLIN SODIUM 1 G
1000 VIAL (EA) INJECTION EVERY 8 HOURS
Refills: 0 | Status: DISCONTINUED | OUTPATIENT
Start: 2021-06-30 | End: 2021-06-30

## 2021-06-30 RX ORDER — VANCOMYCIN HCL 1 G
1000 VIAL (EA) INTRAVENOUS EVERY 8 HOURS
Refills: 0 | Status: DISCONTINUED | OUTPATIENT
Start: 2021-06-30 | End: 2021-06-30

## 2021-06-30 RX ORDER — VANCOMYCIN HCL 1 G
1250 VIAL (EA) INTRAVENOUS EVERY 12 HOURS
Refills: 0 | Status: DISCONTINUED | OUTPATIENT
Start: 2021-06-30 | End: 2021-06-30

## 2021-06-30 RX ORDER — CEPHALEXIN 500 MG
1 CAPSULE ORAL
Qty: 28 | Refills: 0
Start: 2021-06-30 | End: 2021-07-06

## 2021-06-30 RX ADMIN — SODIUM CHLORIDE 3 MILLILITER(S): 9 INJECTION INTRAMUSCULAR; INTRAVENOUS; SUBCUTANEOUS at 06:31

## 2021-06-30 RX ADMIN — Medication 100 MILLIGRAM(S): at 14:51

## 2021-06-30 RX ADMIN — SODIUM CHLORIDE 3 MILLILITER(S): 9 INJECTION INTRAMUSCULAR; INTRAVENOUS; SUBCUTANEOUS at 13:15

## 2021-06-30 RX ADMIN — Medication 250 MILLIGRAM(S): at 06:53

## 2021-06-30 NOTE — PROGRESS NOTE ADULT - SUBJECTIVE AND OBJECTIVE BOX
Follow Up:  leg cellulitis and abscess    Interval History: pt feels much better the abscess cx came back with staph aureus    ROS:      All other systems negative    Constitutional: no fever, no chills  Cardiovascular:  no chest pain, no palpitation  Respiratory:  no SOB, no cough  GI:  no abd pain, no vomiting, no diarrhea  urinary: no dysuria, no hematuria, no flank pain  musculoskeletal:  improved L leg pain and swelling   skin:  no rash  neurology:  no headache, no seizure    Allergies  No Known Allergies        ANTIMICROBIALS:  cefepime   IVPB    cefepime   IVPB 1000 every 12 hours  vancomycin  IVPB 1000 every 12 hours      OTHER MEDS:  potassium chloride    Tablet ER 40 milliEquivalent(s) Oral every 4 hours  sodium chloride 0.9% lock flush 3 milliLiter(s) IV Push every 8 hours      Vital Signs Last 24 Hrs  T(C): 36.7 (29 Jun 2021 09:42), Max: 36.7 (29 Jun 2021 09:42)  T(F): 98 (29 Jun 2021 09:42), Max: 98 (29 Jun 2021 09:42)  HR: 89 (29 Jun 2021 09:42) (65 - 89)  BP: 120/74 (29 Jun 2021 09:42) (120/74 - 128/79)  BP(mean): --  RR: 18 (29 Jun 2021 09:42) (16 - 18)  SpO2: 99% (29 Jun 2021 09:42) (99% - 100%)    Physical Exam:  General:    NAD,  non toxic  Cardio:     regular S1, S2,  no murmur  Respiratory:    clear b/l,    no wheezing  abd:     soft,   BS +,   no tenderness  :   no CVAT,  no suprapubic tenderness,   no  carlos  Musculoskeletal:  improved L leg edema, erythema, tenderness, small incision  vascular: no phlebitis  Skin:    no rash                          13.5   6.97  )-----------( 262      ( 29 Jun 2021 07:23 )             40.0       06-29    139  |  101  |  10  ----------------------------<  96  3.1<L>   |  26  |  0.70    Ca    9.0      29 Jun 2021 07:23  Phos  3.4     06-28  Mg     2.1     06-28    TPro  6.9  /  Alb  3.8  /  TBili  0.3  /  DBili  x   /  AST  14  /  ALT  16  /  AlkPhos  63  06-29          MICROBIOLOGY:  v  .Abscess LEFT LOWER LEG ABSCESS  06-28-21   Moderate Staphylococcus aureus  --  --      .Blood Blood-Venous  06-28-21   No growth to date.  --  --      .Blood Blood-Peripheral  06-28-21   No growth to date.  --  --                RADIOLOGY:  Images independently visualized and reviewed personally, findings as below  < from: Xray Tibia + Fibula 2 Views, Left (06.28.21 @ 01:06) >  IMPRESSION:  Left lower extremity swelling. No radiographic evidence for osteomyelitis.      < end of copied text >  
  TEAM SURGERY PROGRESS NOTE    SUBJECTIVE: Patient seen and examined at bedside on AM rounds, patient without complaints.     Vital Signs Last 24 Hrs  T(C): 36.6 (29 Jun 2021 05:26), Max: 36.7 (28 Jun 2021 11:00)  T(F): 97.9 (29 Jun 2021 05:26), Max: 98.1 (28 Jun 2021 11:00)  HR: 65 (29 Jun 2021 05:26) (64 - 80)  BP: 121/55 (29 Jun 2021 05:26) (115/75 - 128/79)  BP(mean): --  RR: 16 (29 Jun 2021 05:26) (16 - 18)  SpO2: 99% (29 Jun 2021 05:26) (99% - 100%)  I&O's Detail    MEDICATIONS  (STANDING):  cefepime   IVPB      cefepime   IVPB 1000 milliGRAM(s) IV Intermittent every 12 hours  potassium chloride    Tablet ER 40 milliEquivalent(s) Oral every 4 hours  sodium chloride 0.9% lock flush 3 milliLiter(s) IV Push every 8 hours  vancomycin  IVPB 1000 milliGRAM(s) IV Intermittent every 12 hours    MEDICATIONS  (PRN):      Physical Exam:  General: Well-developed, in no acute distress   Neurologic: Awake, alert, GCS 15, No focal Deficits   Respiratory: Normal respiratory effort  CVS: RRR, perfusing adequately  Abdomen: Abdomen soft, NT/ND, no rebound or guarding   Ext: L anterior lower leg w/ overlying abrasion w/o erythema. No crepitus or fluctuance, small incision noted, no drainage expressed on palpation    LABS:                        13.5   6.97  )-----------( 262      ( 29 Jun 2021 07:23 )             40.0     06-29    139  |  101  |  10  ----------------------------<  96  3.1<L>   |  26  |  0.70    Ca    9.0      29 Jun 2021 07:23  Phos  3.4     06-28  Mg     2.1     06-28    TPro  6.9  /  Alb  3.8  /  TBili  0.3  /  DBili  x   /  AST  14  /  ALT  16  /  AlkPhos  63  06-29            Patient is a 21y Male  
Name of Patient : ELIJAH VILLARREAL  MRN: [id number]  DATE OF SERVICE: 06-28-21 @ 17:11    Subjective: Patient seen and examined. No new events except as noted.   doing okay   on IV antibiotics       REVIEW OF SYSTEMS:    CONSTITUTIONAL: No weakness, fevers or chills  EYES/ENT: No visual changes;  No vertigo or throat pain   NECK: No pain or stiffness  RESPIRATORY: No cough, wheezing, hemoptysis; No shortness of breath  CARDIOVASCULAR: No chest pain or palpitations  GASTROINTESTINAL: No abdominal or epigastric pain.   GENITOURINARY: No dysuria, frequency or hematuria  NEUROLOGICAL: No numbness or weakness  SKIN: No itching, burning, rashes, or lesions   All other review of systems is negative unless indicated above.    MEDICATIONS:  MEDICATIONS  (STANDING):  cefepime   IVPB      cefepime   IVPB 1000 milliGRAM(s) IV Intermittent every 12 hours  sodium chloride 0.9% lock flush 3 milliLiter(s) IV Push every 8 hours  vancomycin  IVPB 1000 milliGRAM(s) IV Intermittent every 12 hours      PHYSICAL EXAM:  T(C): 36.6 (06-28-21 @ 15:00), Max: 36.7 (06-28-21 @ 11:00)  HR: 80 (06-28-21 @ 15:00) (64 - 93)  BP: 126/78 (06-28-21 @ 15:00) (115/74 - 141/86)  RR: 18 (06-28-21 @ 15:00) (16 - 18)  SpO2: 100% (06-28-21 @ 15:00) (98% - 100%)  Wt(kg): --  I&O's Summary        Appearance: Normal	  HEENT:  PERRLA   Lymphatic: No lymphadenopathy   Cardiovascular: Normal S1 S2, no JVD  Respiratory: normal effort , clear  Gastrointestinal:  Soft, Non-tender  Skin: No rashes,  warm to touch  Psychiatry:  Mood & affect appropriate  Musculuskeletal: leg dressing       All labs, Imaging and EKGs personally reviewed                           12.0   7.67  )-----------( 211      ( 28 Jun 2021 05:40 )             35.0               06-28    137  |  101  |  12  ----------------------------<  95  3.2<L>   |  24  |  0.73    Ca    8.9      28 Jun 2021 05:40  Phos  3.4     06-28  Mg     2.1     06-28    TPro  7.6  /  Alb  4.1  /  TBili  0.4  /  DBili  x   /  AST  15  /  ALT  17  /  AlkPhos  67  06-28                                           
Name of Patient : ELIJAH VILLARREAL  MRN: [id number]  DATE OF SERVICE: 06-29-21\    Subjective: Patient seen and examined. No new events except as noted.   fopingokay   dressing LE     REVIEW OF SYSTEMS:    CONSTITUTIONAL: No weakness, fevers or chills  EYES/ENT: No visual changes;  No vertigo or throat pain   NECK: No pain or stiffness  RESPIRATORY: No cough, wheezing, hemoptysis; No shortness of breath  CARDIOVASCULAR: No chest pain or palpitations  GASTROINTESTINAL: No abdominal or epigastric pain. No nausea, vomiting, or hematemesis; No diarrhea or constipation. No melena or hematochezia.  GENITOURINARY: No dysuria, frequency or hematuria  NEUROLOGICAL: No numbness or weakness  SKIN: No itching, burning, rashes, or lesions   All other review of systems is negative unless indicated above.    MEDICATIONS:  MEDICATIONS  (STANDING):  sodium chloride 0.9% lock flush 3 milliLiter(s) IV Push every 8 hours  vancomycin  IVPB 1000 milliGRAM(s) IV Intermittent every 12 hours      PHYSICAL EXAM:  T(C): 37.2 (06-29-21 @ 21:45), Max: 37.2 (06-29-21 @ 21:45)  HR: 70 (06-29-21 @ 21:45) (65 - 89)  BP: 120/73 (06-29-21 @ 21:45) (107/56 - 121/55)  RR: 18 (06-29-21 @ 21:45) (16 - 18)  SpO2: 100% (06-29-21 @ 21:45) (99% - 100%)  Wt(kg): --  I&O's Summary    29 Jun 2021 07:01  -  29 Jun 2021 22:32  --------------------------------------------------------  IN: 0 mL / OUT: 650 mL / NET: -650 mL      Height (cm): 180.3 (06-29 @ 05:26)  Weight (kg): 68 (06-29 @ 05:26)  BMI (kg/m2): 20.9 (06-29 @ 05:26)  BSA (m2): 1.87 (06-29 @ 05:26)    Appearance: Normal	  HEENT:  PERRLA   Lymphatic: No lymphadenopathy   Cardiovascular: Normal S1 S2, no JVD  Respiratory: normal effort , clear  Gastrointestinal:  Soft, Non-tender  Skin: No rashes,  warm to touch  Psychiatry:  Mood & affect appropriate  Musculuskeletal: LE dressign       All labs, Imaging and EKGs personally reviewed     06-29-21 @ 07:01  -  06-29-21 @ 22:32  --------------------------------------------------------  IN: 0 mL / OUT: 650 mL / NET: -650 mL                            13.5   6.97  )-----------( 262      ( 29 Jun 2021 07:23 )             40.0               06-29    139  |  101  |  10  ----------------------------<  96  3.1<L>   |  26  |  0.70    Ca    9.0      29 Jun 2021 07:23  Phos  3.4     06-28  Mg     2.1     06-28    TPro  6.9  /  Alb  3.8  /  TBili  0.3  /  DBili  x   /  AST  14  /  ALT  16  /  AlkPhos  63  06-29                               
  Follow Up:  leg cellulitis and abscess    Interval History: significant improvement in the swelling and pain of the leg, abscess cx showed MSSA    ROS:      All other systems negative    Constitutional: no fever, no chills  Cardiovascular:  no chest pain, no palpitation  Respiratory:  no SOB, no cough  GI:  no abd pain, no vomiting, no diarrhea  urinary: no dysuria, no hematuria, no flank pain  musculoskeletal:  improved L leg pain and swelling   skin:  no rash  neurology:  no headache, no seizure        Allergies  No Known Allergies        ANTIMICROBIALS:  ceFAZolin   IVPB 1000 every 8 hours      OTHER MEDS:  sodium chloride 0.9% lock flush 3 milliLiter(s) IV Push every 8 hours      Vital Signs Last 24 Hrs  T(C): 36.6 (30 Jun 2021 10:50), Max: 37.2 (29 Jun 2021 21:45)  T(F): 97.9 (30 Jun 2021 10:50), Max: 99 (29 Jun 2021 21:45)  HR: 74 (30 Jun 2021 10:50) (68 - 74)  BP: 107/71 (30 Jun 2021 10:50) (107/56 - 120/73)  BP(mean): --  RR: 18 (30 Jun 2021 10:50) (18 - 18)  SpO2: 100% (30 Jun 2021 10:50) (100% - 100%)    Physical Exam:  General:    NAD,  non toxic  Cardio:     regular S1, S2,  no murmur  Respiratory:    clear b/l,    no wheezing  abd:     soft,   BS +,   no tenderness  :   no CVAT,  no suprapubic tenderness,   no  carlos  Musculoskeletal: significantly improved L leg edema, erythema, tenderness, small incision, no purulence or fluctuation  vascular: no phlebitis  Skin:    no rash                          14.5   7.47  )-----------( 300      ( 30 Jun 2021 04:24 )             43.5       06-30    136  |  98  |  11  ----------------------------<  83  3.9   |  22  |  0.76    Ca    10.0      30 Jun 2021 04:24    TPro  8.3  /  Alb  4.4  /  TBili  0.5  /  DBili  x   /  AST  15  /  ALT  15  /  AlkPhos  66  06-30          MICROBIOLOGY:  Vancomycin Level, Trough: 3.4 ug/mL (06-30-21 @ 04:24)  v  .Abscess LEFT LOWER LEG ABSCESS  06-28-21   Moderate Staphylococcus aureus  --  Staphylococcus aureus      .Blood Blood-Venous  06-28-21   No growth to date.  --  --      .Blood Blood-Peripheral  06-28-21   No growth to date.  --  --                RADIOLOGY:  Images independently visualized and reviewed personally, findings as below  < from: Xray Tibia + Fibula 2 Views, Left (06.28.21 @ 01:06) >  IMPRESSION:  Left lower extremity swelling. No radiographic evidence for osteomyelitis.      < end of copied text >

## 2021-06-30 NOTE — DISCHARGE NOTE NURSING/CASE MANAGEMENT/SOCIAL WORK - NSDCFUADDAPPT_GEN_ALL_CORE_FT
Follow up with your primary care doctor within one week of discharge. If you do not have one, you can call the medicine clinic at [phone] to make an appointment.
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
## 2021-06-30 NOTE — DISCHARGE NOTE PROVIDER - NSDCCPCAREPLAN_GEN_ALL_CORE_FT
PRINCIPAL DISCHARGE DIAGNOSIS  Diagnosis: Cellulitis and abscess  Assessment and Plan of Treatment:        PRINCIPAL DISCHARGE DIAGNOSIS  Diagnosis: Cellulitis and abscess  Assessment and Plan of Treatment: You presented to the hospital with leg infeciton, and you were found to have an abscess and cellulitis.  The abscess was drained in the ED and the culture grew MSSA (staph infection).  Your blood cultures were negative for infection.  You were followed by the Infectious Disease Team and you were treated with IV Antibiotics.  Please complete oral antibiotics as prescribed.  Continue with daily wound care and dressing changes. Please establish care for follow up with a Primary Care Physician within one week of discharge.  If needed, you may contact [facility] ([phone]) for follow up appointment.

## 2021-06-30 NOTE — DISCHARGE NOTE NURSING/CASE MANAGEMENT/SOCIAL WORK - PATIENT PORTAL LINK FT
You can access the FollowMyHealth Patient Portal offered by [facility] by registering at the following website: [email or web address]. By joining [facility]’s FollowMyHealth portal, you will also be able to view your health information using other applications (apps) compatible with our system.

## 2021-06-30 NOTE — DISCHARGE NOTE PROVIDER - NSFOLLOWUPCLINICS_GEN_ALL_ED_FT
[facility]  Internal Medicine  [address] [place] [address]  Phone: [phone]  Fax: [phone]

## 2021-06-30 NOTE — DISCHARGE NOTE PROVIDER - PROVIDER TOKENS
FREE:[LAST:[Your Primary Care Doctor],PHONE:[(   )    -],FAX:[(   )    -]],PROVIDER:[TOKEN:[[id number]]]

## 2021-06-30 NOTE — DISCHARGE NOTE PROVIDER - CARE PROVIDER_API CALL
Your Primary Care Doctor,   Phone: (   )    -  Fax: (   )    -  Follow Up Time:     Jadyn Bermudez)  Internal Medicine  [address] [place] [address], [place] [address]  Phone: [phone]  Fax: [phone]  Follow Up Time:

## 2021-06-30 NOTE — PROGRESS NOTE ADULT - REASON FOR ADMISSION
Cellulitis and abscess
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
## 2021-06-30 NOTE — DISCHARGE NOTE PROVIDER - NSDCFUADDAPPT_GEN_ALL_CORE_FT
Follow up with your primary care doctor within one week of discharge. If you do not have one, you can call the medicine clinic at [phone] to make an appointment.

## 2021-07-03 LAB
CULTURE RESULTS: SIGNIFICANT CHANGE UP
ORGANISM # SPEC MICROSCOPIC CNT: SIGNIFICANT CHANGE UP
ORGANISM # SPEC MICROSCOPIC CNT: SIGNIFICANT CHANGE UP
SPECIMEN SOURCE: SIGNIFICANT CHANGE UP

## 2023-12-20 NOTE — ED ADULT NURSE NOTE - PAIN RATING/NUMBER SCALE (0-10): REST
[Good] : ~his/her~  mood as  good [Yes] : Yes [Monthly or less (1 pt)] : Monthly or less (1 point) [1 or 2 (0 pts)] : 1 or 2 (0 points) [Never (0 pts)] : Never (0 points) [No] : In the past 12 months have you used drugs other than those required for medical reasons? No [No falls in past year] : Patient reported no falls in the past year [0] : 2) Feeling down, depressed, or hopeless: Not at all (0) [Patient reported PAP Smear was normal] : Patient reported PAP Smear was normal [None] : None [With Significant Other] : lives with significant other [Employed] : employed [] :  [Sexually Active] : sexually active [Feels Safe at Home] : Feels safe at home [Fully functional (bathing, dressing, toileting, transferring, walking, feeding)] : Fully functional (bathing, dressing, toileting, transferring, walking, feeding) [Fully functional (using the telephone, shopping, preparing meals, housekeeping, doing laundry, using] : Fully functional and needs no help or supervision to perform IADLs (using the telephone, shopping, preparing meals, housekeeping, doing laundry, using transportation, managing medications and managing finances) [Reports changes in hearing] : Reports no changes in hearing [Reports changes in vision] : Reports no changes in vision [Reports changes in dental health] : Reports no changes in dental health [PapSmearDate] : 2023 [Never] : Never 7

## 2025-02-21 NOTE — ED ADULT TRIAGE NOTE - MODE OF ARRIVAL
[employer] Approved      
[employer] Pending    Insurance response  Prescription Drug Insurance:   [facility]          Notes: Prior authorization submitted - will update provider when decision has been made by insurance.       
Walk in